# Patient Record
Sex: MALE | Race: WHITE
[De-identification: names, ages, dates, MRNs, and addresses within clinical notes are randomized per-mention and may not be internally consistent; named-entity substitution may affect disease eponyms.]

---

## 2018-07-08 ENCOUNTER — HOSPITAL ENCOUNTER (EMERGENCY)
Dept: HOSPITAL 46 - ED | Age: 38
Discharge: HOME | End: 2018-07-08
Payer: SELF-PAY

## 2018-07-08 VITALS — BODY MASS INDEX: 26.66 KG/M2 | WEIGHT: 180.01 LBS | HEIGHT: 69 IN

## 2018-07-08 DIAGNOSIS — F17.200: ICD-10-CM

## 2018-07-08 DIAGNOSIS — S43.005A: Primary | ICD-10-CM
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
## 2018-07-08 PROCEDURE — 0RSKXZZ REPOSITION LEFT SHOULDER JOINT, EXTERNAL APPROACH: ICD-10-PCS | Performed by: INTERNAL MEDICINE

## 2019-02-06 ENCOUNTER — HOSPITAL ENCOUNTER (EMERGENCY)
Dept: HOSPITAL 46 - ED | Age: 39
Discharge: HOME | End: 2019-02-06
Payer: SELF-PAY

## 2019-02-06 VITALS — HEIGHT: 69 IN | BODY MASS INDEX: 26.66 KG/M2 | WEIGHT: 180.01 LBS
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
## 2019-02-06 DIAGNOSIS — S22.42XA: Primary | ICD-10-CM

## 2019-02-06 DIAGNOSIS — F17.200: ICD-10-CM

## 2024-09-11 ENCOUNTER — HOSPITAL ENCOUNTER (EMERGENCY)
Dept: HOSPITAL 46 - ED | Age: 44
Discharge: HOME | End: 2024-09-11
Payer: SELF-PAY

## 2024-09-11 VITALS — SYSTOLIC BLOOD PRESSURE: 133 MMHG | DIASTOLIC BLOOD PRESSURE: 96 MMHG

## 2024-09-11 VITALS — HEIGHT: 69 IN | BODY MASS INDEX: 36.67 KG/M2 | WEIGHT: 247.58 LBS

## 2024-09-11 DIAGNOSIS — R07.89: Primary | ICD-10-CM

## 2024-09-11 DIAGNOSIS — F17.200: ICD-10-CM

## 2024-09-11 LAB
ALBUMIN SERPL-MCNC: 4.1 G/DL (ref 3.4–5)
ALBUMIN/GLOB SERPL: 1.21 {RATIO} (ref 1.1–2.4)
ALP SERPL-CCNC: 89 U/L (ref 46–116)
ALT SERPL W P-5'-P-CCNC: 39 U/L (ref 14–59)
ANION GAP SERPL CALCULATED.4IONS-SCNC: 11.9 MMOL/L (ref 7–21)
AST SERPL-CCNC: 16 U/L (ref 15–37)
BASOPHILS NFR BLD AUTO: 0.8 % (ref 0–2)
BUN SERPL-MCNC: 22 MG/DL (ref 7–18)
BUN/CREAT SERPL: 21.15 (ref 6–28.6)
CALCIUM SERPL-MCNC: 9.6 MG/DL (ref 8.5–10.1)
CHLORIDE SERPL-SCNC: 103 MMOL/L (ref 98–107)
CO2 SERPL-SCNC: 28 MMOL/L (ref 21–32)
DEPRECATED RDW RBC AUTO: 13.1 FL (ref 10.5–15)
EGFRCR SERPLBLD CKD-EPI 2021: 90 ML/MIN (ref 60–?)
EOSINOPHIL NFR BLD AUTO: 4.8 % (ref 0–6)
GLOBULIN SER-MCNC: 3.4 G/DL (ref 1.8–3.5)
HCT VFR BLD AUTO: 48.2 % (ref 35–50)
HGB BLD-MCNC: 16.1 G/DL (ref 12–18)
LYMPHOCYTES NFR BLD AUTO: 31.3 % (ref 24–44)
MAGNESIUM SERPL-MCNC: 1.9 MG/DL (ref 1.8–2.4)
MCH RBC QN AUTO: 29.6 PG (ref 27–36)
MCHC RBC AUTO-ENTMCNC: 33.3 G/DL (ref 30–36)
MCV RBC AUTO: 88.8 FL (ref 81–99)
MONOCYTES NFR BLD AUTO: 7 % (ref 0–12)
NEUTROPHILS NFR BLD AUTO: 56.1 % (ref 39–80)
PLATELET # BLD AUTO: 262 K/UL (ref 140–440)
POTASSIUM SERPL-SCNC: 3.9 MMOL/L (ref 3.5–5.1)
PROT SERPL-MCNC: 7.5 G/DL (ref 6.4–8.2)
RBC # BLD AUTO: 5.43 M/UL (ref 4.3–5.7)

## 2025-07-20 ENCOUNTER — HOSPITAL ENCOUNTER (EMERGENCY)
Dept: HOSPITAL 46 - ED | Age: 45
Discharge: HOME | End: 2025-07-20
Payer: COMMERCIAL

## 2025-07-20 VITALS — WEIGHT: 250.22 LBS | HEIGHT: 69 IN | BODY MASS INDEX: 37.06 KG/M2

## 2025-07-20 VITALS — SYSTOLIC BLOOD PRESSURE: 152 MMHG | DIASTOLIC BLOOD PRESSURE: 100 MMHG
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
## 2025-07-20 DIAGNOSIS — L08.9: ICD-10-CM

## 2025-07-20 DIAGNOSIS — F17.200: ICD-10-CM

## 2025-07-20 DIAGNOSIS — S61.412A: Primary | ICD-10-CM
